# Patient Record
Sex: FEMALE | NOT HISPANIC OR LATINO | URBAN - METROPOLITAN AREA
[De-identification: names, ages, dates, MRNs, and addresses within clinical notes are randomized per-mention and may not be internally consistent; named-entity substitution may affect disease eponyms.]

---

## 2017-08-30 ENCOUNTER — IMPORTED ENCOUNTER (OUTPATIENT)
Dept: URBAN - METROPOLITAN AREA CLINIC 31 | Facility: CLINIC | Age: 57
End: 2017-08-30

## 2017-08-30 PROCEDURE — 92015 DETERMINE REFRACTIVE STATE: CPT

## 2017-08-30 PROCEDURE — 92310 CONTACT LENS FITTING OU: CPT

## 2017-08-30 PROCEDURE — 92014 COMPRE OPH EXAM EST PT 1/>: CPT

## 2017-08-30 NOTE — PATIENT DISCUSSION
1.  [place]-  getting new glasses will wear more. 2. Wearing Clariti MONO but needs more reading. Int in more moist cl. Pt has 1-2 mths of cls left. Gave samples of Oasys 1 day -4.00-0.75x180/-2.00-1.25x090 MONO for more moist cl and more reading on left. May need to change right cl to -3.75 for eyes to balance. Right Dominant. Using [employer] for glasses Eval 80.

## 2018-09-10 ENCOUNTER — IMPORTED ENCOUNTER (OUTPATIENT)
Dept: URBAN - METROPOLITAN AREA CLINIC 31 | Facility: CLINIC | Age: 58
End: 2018-09-10

## 2018-09-10 PROCEDURE — 92310 CONTACT LENS FITTING OU: CPT

## 2018-09-10 PROCEDURE — 92015 DETERMINE REFRACTIVE STATE: CPT

## 2018-09-10 PROCEDURE — 92014 COMPRE OPH EXAM EST PT 1/>: CPT

## 2018-09-10 NOTE — PATIENT DISCUSSION
1.  [place]-  getting new glasses will wear more. increase dist. 2. Wearing Oasys 1 day MONO. Disc cutting back OS to give more dist.  Pt is fine.  -4.00-0.75x180/-2.00-1.25x090 MONO for more moist cl. Right Dominant. Using [employer]emed for glasses Eval 80.3.   RTN 1 yr CE  [employer]

## 2019-09-27 ENCOUNTER — IMPORTED ENCOUNTER (OUTPATIENT)
Dept: URBAN - METROPOLITAN AREA CLINIC 31 | Facility: CLINIC | Age: 59
End: 2019-09-27

## 2019-10-03 ENCOUNTER — IMPORTED ENCOUNTER (OUTPATIENT)
Dept: URBAN - METROPOLITAN AREA CLINIC 31 | Facility: CLINIC | Age: 59
End: 2019-10-03

## 2019-10-03 PROCEDURE — 92014 COMPRE OPH EXAM EST PT 1/>: CPT

## 2019-10-03 PROCEDURE — 92015 DETERMINE REFRACTIVE STATE: CPT

## 2019-10-03 PROCEDURE — 92310 CONTACT LENS FITTING OU: CPT

## 2020-10-05 ENCOUNTER — IMPORTED ENCOUNTER (OUTPATIENT)
Dept: URBAN - METROPOLITAN AREA CLINIC 31 | Facility: CLINIC | Age: 60
End: 2020-10-05

## 2020-10-05 PROBLEM — H02.834: Noted: 2020-10-05

## 2020-10-05 PROBLEM — H02.831: Noted: 2020-10-05

## 2020-10-05 PROCEDURE — 92310 CONTACT LENS FITTING OU: CPT

## 2020-10-05 PROCEDURE — 92014 COMPRE OPH EXAM EST PT 1/>: CPT

## 2020-10-05 PROCEDURE — V2521 CNTCT LENS HYDROPHILIC TORIC: HCPCS

## 2020-10-05 NOTE — PATIENT DISCUSSION
1.  Dermatochalasis OU:  Patient currently symptomatic. --Wants sx-- will call micheal. 2. [place]-  getting new glasses will wear more. increase dist. 2. Wearing Oasys 1 day MONO. Disc cutting back OS to give more dist.  Pt is fine.  -4.00-0.75x180/-2.00-1.25x090 MONO for more moist cl. Right Dominant. Using Eyemed for glasses Eval 80.3.   RTN 1 yr CE  [facility] to [employer] summer 2021

## 2020-10-05 NOTE — PATIENT DISCUSSION
1.  [place]-  getting new glasses will wear more. increase dist. 2. Wearing Oasys 1 day MONO. Disc cutting back OS to give more dist.  Pt is fine.  -4.00-0.75x180/-2.00-1.25x090 MONO for more moist cl. Right Dominant. Using Eyemed for glasses Eval 80.3.   RTN 1 yr CE  [facility] [place]

## 2020-11-17 ENCOUNTER — OFFICE VISIT (OUTPATIENT)
Dept: URBAN - METROPOLITAN AREA CLINIC 68 | Facility: CLINIC | Age: 60
End: 2020-11-17

## 2020-11-25 ENCOUNTER — OFFICE VISIT (OUTPATIENT)
Dept: URBAN - METROPOLITAN AREA CLINIC 68 | Facility: CLINIC | Age: 60
End: 2020-11-25

## 2020-12-01 ENCOUNTER — OFFICE VISIT (OUTPATIENT)
Dept: URBAN - METROPOLITAN AREA CLINIC 68 | Facility: CLINIC | Age: 60
End: 2020-12-01

## 2020-12-02 ENCOUNTER — OFFICE VISIT (OUTPATIENT)
Dept: URBAN - METROPOLITAN AREA CLINIC 68 | Facility: CLINIC | Age: 60
End: 2020-12-02

## 2020-12-09 ENCOUNTER — OFFICE VISIT (OUTPATIENT)
Dept: URBAN - METROPOLITAN AREA SURGERY CENTER 12 | Facility: SURGERY CENTER | Age: 60
End: 2020-12-09

## 2020-12-10 ENCOUNTER — LAB OUTSIDE AN ENCOUNTER (OUTPATIENT)
Dept: URBAN - METROPOLITAN AREA CLINIC 68 | Facility: CLINIC | Age: 60
End: 2020-12-10

## 2020-12-10 LAB — 01: (no result)

## 2020-12-16 ENCOUNTER — TELEPHONE ENCOUNTER (OUTPATIENT)
Dept: URBAN - METROPOLITAN AREA CLINIC 68 | Facility: CLINIC | Age: 60
End: 2020-12-16

## 2020-12-17 ENCOUNTER — TELEPHONE ENCOUNTER (OUTPATIENT)
Dept: URBAN - METROPOLITAN AREA CLINIC 68 | Facility: CLINIC | Age: 60
End: 2020-12-17

## 2020-12-18 ENCOUNTER — TELEPHONE ENCOUNTER (OUTPATIENT)
Dept: URBAN - METROPOLITAN AREA CLINIC 68 | Facility: CLINIC | Age: 60
End: 2020-12-18

## 2020-12-22 ENCOUNTER — OFFICE VISIT (OUTPATIENT)
Dept: URBAN - METROPOLITAN AREA CLINIC 68 | Facility: CLINIC | Age: 60
End: 2020-12-22

## 2021-03-30 ENCOUNTER — OFFICE VISIT (OUTPATIENT)
Dept: URBAN - METROPOLITAN AREA CLINIC 68 | Facility: CLINIC | Age: 61
End: 2021-03-30

## 2021-04-13 ENCOUNTER — OFFICE VISIT (OUTPATIENT)
Dept: URBAN - METROPOLITAN AREA CLINIC 68 | Facility: CLINIC | Age: 61
End: 2021-04-13

## 2021-05-13 ENCOUNTER — OFFICE VISIT (OUTPATIENT)
Dept: URBAN - METROPOLITAN AREA CLINIC 68 | Facility: CLINIC | Age: 61
End: 2021-05-13

## 2021-05-18 ENCOUNTER — OFFICE VISIT (OUTPATIENT)
Dept: URBAN - METROPOLITAN AREA CLINIC 68 | Facility: CLINIC | Age: 61
End: 2021-05-18

## 2021-06-30 ENCOUNTER — OFFICE VISIT (OUTPATIENT)
Dept: URBAN - METROPOLITAN AREA CLINIC 68 | Facility: CLINIC | Age: 61
End: 2021-06-30

## 2021-07-22 ENCOUNTER — TELEPHONE ENCOUNTER (OUTPATIENT)
Dept: URBAN - METROPOLITAN AREA CLINIC 68 | Facility: CLINIC | Age: 61
End: 2021-07-22

## 2021-07-22 ENCOUNTER — OFFICE VISIT (OUTPATIENT)
Dept: URBAN - METROPOLITAN AREA CLINIC 68 | Facility: CLINIC | Age: 61
End: 2021-07-22

## 2021-08-17 ENCOUNTER — OFFICE VISIT (OUTPATIENT)
Dept: URBAN - METROPOLITAN AREA CLINIC 68 | Facility: CLINIC | Age: 61
End: 2021-08-17

## 2021-10-19 ENCOUNTER — IMPORTED ENCOUNTER (OUTPATIENT)
Dept: URBAN - METROPOLITAN AREA CLINIC 31 | Facility: CLINIC | Age: 61
End: 2021-10-19

## 2021-10-19 PROBLEM — H02.831: Noted: 2021-10-19

## 2021-10-19 PROBLEM — H02.834: Noted: 2021-10-19

## 2021-10-19 PROCEDURE — 92310 CONTACT LENS FITTING OU: CPT

## 2021-10-19 PROCEDURE — 92014 COMPRE OPH EXAM EST PT 1/>: CPT

## 2021-10-19 NOTE — PATIENT DISCUSSION
1.  Dermatochalasis OU:  Patient currently symptomatic. --Wants sx-- will call micheal. 2. [place]-  getting new glasses will wear more. increase dist. 2. Wearing Oasys 1 day MONO.  --Order more moist cl and more reading--- My day -4.00-0.75x180/-1.75-1.25x090 MONO for more  near. Right Dominant. Eval 130.3.   RTN 1 yr CE  [facility] to [employer] summer 2021

## 2021-12-22 ENCOUNTER — OFFICE VISIT (OUTPATIENT)
Dept: URBAN - METROPOLITAN AREA CLINIC 68 | Facility: CLINIC | Age: 61
End: 2021-12-22

## 2022-04-01 ASSESSMENT — TONOMETRY
OS_IOP_MMHG: 17
OS_IOP_MMHG: 17
OD_IOP_MMHG: 18
OS_IOP_MMHG: 18
OD_IOP_MMHG: 16
OD_IOP_MMHG: 17
OS_IOP_MMHG: 18
OD_IOP_MMHG: 18
OS_IOP_MMHG: 20
OD_IOP_MMHG: 17

## 2022-04-01 ASSESSMENT — VISUAL ACUITY
OS_SC: 20/20
OD_SC: 20/25

## 2022-06-04 ENCOUNTER — TELEPHONE ENCOUNTER (OUTPATIENT)
Dept: URBAN - METROPOLITAN AREA CLINIC 68 | Facility: CLINIC | Age: 62
End: 2022-06-04

## 2022-06-04 RX ORDER — CEPHALEXIN 500 MG/1
CEPHALEXIN( 500MG ORAL   ) INACTIVE -HX ENTRY CAPSULE ORAL
OUTPATIENT
Start: 2019-07-24 | End: 2016-08-03

## 2022-06-04 RX ORDER — ESCITALOPRAM 20 MG/1
ESCITALOPRAM OXALATE( 20MG ORAL   ) INACTIVE -HX ENTRY TABLET, FILM COATED ORAL
OUTPATIENT
Start: 2019-11-14

## 2022-06-04 RX ORDER — MIDODRINE HYDROCHLORIDE 2.5 MG/1
MIDODRINE HCL( 2.5MG ORAL   ) INACTIVE -HX ENTRY TABLET ORAL
OUTPATIENT
Start: 2019-11-14

## 2022-06-04 RX ORDER — CLOBETASOL PROPIONATE 0.5 MG/G
CLOBETASOL PROPIONATE( 0.05% EXTERNAL   ) INACTIVE -HX ENTRY OINTMENT TOPICAL
OUTPATIENT
Start: 2019-11-14

## 2022-06-04 RX ORDER — AZITHROMYCIN 250 MG/1
AZITHROMYCIN( 250MG ORAL   ) INACTIVE -HX ENTRY TABLET, FILM COATED ORAL
OUTPATIENT
Start: 2019-11-14

## 2022-06-04 RX ORDER — HYOSCYAMINE SULFATE 0.12 MG/1
TABLET, ORALLY DISINTEGRATING ORAL
Qty: 60 | Refills: 60 | OUTPATIENT
Start: 2019-11-14 | End: 2020-11-17

## 2022-06-04 RX ORDER — HYDROCODONE BITARTRATE AND IBUPROFEN 7.5; 2 MG/1; MG/1
HYDROCODONE-IBUPROFEN( 7.5-200MG ORAL   ) INACTIVE -HX ENTRY TABLET ORAL
OUTPATIENT
Start: 2019-07-24 | End: 2016-08-03

## 2022-06-04 RX ORDER — PROGESTERONE 100 MG/1
PROGESTERONE MICRONIZED( 100MG ORAL   ) INACTIVE -HX ENTRY CAPSULE ORAL
OUTPATIENT
Start: 2019-07-24 | End: 2016-08-03

## 2022-06-04 RX ORDER — CYCLOBENZAPRINE HYDROCHLORIDE 10 MG/10MG
CYCLOBENZAPRINE HCL( 10MG ORAL   ) INACTIVE -HX ENTRY TABLET ORAL
OUTPATIENT
Start: 2019-07-24 | End: 2019-06-28

## 2022-06-04 RX ORDER — CHOLECALCIFEROL (VITAMIN D3) 50 MCG
VITAMIN D( 2000UNIT ORAL   ) INACTIVE -HX ENTRY TABLET ORAL
OUTPATIENT
Start: 2019-07-24 | End: 2016-08-03

## 2022-06-04 RX ORDER — CLINDAMYCIN HYDROCHLORIDE 150 MG/1
CLINDAMYCIN HCL( 150MG ORAL   ) INACTIVE -HX ENTRY CAPSULE ORAL
OUTPATIENT
Start: 2019-07-24 | End: 2016-08-03

## 2022-06-04 RX ORDER — ESCITALOPRAM 10 MG/1
ESCITALOPRAM OXALATE( 10MG ORAL   ) INACTIVE -HX ENTRY TABLET, FILM COATED ORAL
OUTPATIENT
Start: 2019-11-14

## 2022-06-04 RX ORDER — ESTRADIOL 0.05 MG/D
MINIVELLE( 0.05MG/24HR TRANSDERMAL   ) INACTIVE -HX ENTRY FILM, EXTENDED RELEASE TRANSDERMAL
OUTPATIENT
Start: 2019-07-24 | End: 2018-06-27

## 2022-06-04 RX ORDER — PROGESTERONE 200 MG/1
PROGESTERONE MICRONIZED( 200MG ORAL   ) INACTIVE -HX ENTRY CAPSULE, LIQUID FILLED ORAL
OUTPATIENT
Start: 2019-07-24 | End: 2018-06-27

## 2022-06-04 RX ORDER — OXYCODONE AND ACETAMINOPHEN 5; 325 MG/1; MG/1
OXYCODONE-ACETAMINOPHEN( 5-325MG ORAL   ) INACTIVE -HX ENTRY TABLET ORAL
OUTPATIENT
Start: 2019-07-24 | End: 2016-08-03

## 2022-06-04 RX ORDER — PRAVASTATIN SODIUM 10 MG/1
PRAVASTATIN SODIUM( 10MG ORAL   ) INACTIVE -HX ENTRY TABLET ORAL
OUTPATIENT
Start: 2019-07-24 | End: 2018-06-27

## 2022-06-04 RX ORDER — VALSARTAN AND HYDROCHLOROTHIAZIDE 160; 12.5 MG/1; MG/1
VALSARTAN-HYDROCHLOROTHIAZIDE( 160-12.5MG ORAL   ) INACTIVE -HX ENTRY TABLET, FILM COATED ORAL
OUTPATIENT
Start: 2018-07-31 | End: 2018-07-31

## 2022-06-04 RX ORDER — POLYETHYLENE GLYCOL 3350, SODIUM SULFATE, SODIUM CHLORIDE, POTASSIUM CHLORIDE, ASCORBIC ACID, SODIUM ASCORBATE 140-9-5.2G
KIT ORAL AS DIRECTED
Qty: 1 | Refills: 0 | OUTPATIENT
Start: 2020-11-17 | End: 2020-11-18

## 2022-06-04 RX ORDER — FLUTICASONE PROPIONATE 50 UG/1
FLUTICASONE PROPIONATE( 50MCG/ACT NASAL   ) INACTIVE -HX ENTRY SPRAY, METERED NASAL
OUTPATIENT
Start: 2019-11-14

## 2022-06-05 ENCOUNTER — TELEPHONE ENCOUNTER (OUTPATIENT)
Dept: URBAN - METROPOLITAN AREA CLINIC 68 | Facility: CLINIC | Age: 62
End: 2022-06-05

## 2022-06-05 RX ORDER — DULOXETINE 60 MG/1
DULOXETINE HCL( 60MG ORAL   ) ACTIVE -HX ENTRY CAPSULE, DELAYED RELEASE ORAL
Status: ACTIVE | COMMUNITY
Start: 2021-07-22

## 2022-06-05 RX ORDER — LOSARTAN POTASSIUM AND HYDROCHLOROTHIAZIDE 50; 12.5 MG/1; MG/1
LOSARTAN POTASSIUM-HCTZ( 50-12.5MG ORAL   ) ACTIVE -HX ENTRY TABLET, FILM COATED ORAL
Status: ACTIVE | COMMUNITY
Start: 2021-07-22

## 2022-06-05 RX ORDER — PRAVASTATIN SODIUM 20 MG/1
PRAVASTATIN SODIUM( 20MG ORAL   ) ACTIVE -HX ENTRY TABLET ORAL
Status: ACTIVE | COMMUNITY
Start: 2021-07-22

## 2022-06-25 ENCOUNTER — TELEPHONE ENCOUNTER (OUTPATIENT)
Age: 62
End: 2022-06-25

## 2022-06-25 RX ORDER — ESCITALOPRAM 10 MG/1
ESCITALOPRAM OXALATE( 10MG ORAL   ) INACTIVE -HX ENTRY TABLET, FILM COATED ORAL
OUTPATIENT
Start: 2019-11-14

## 2022-06-25 RX ORDER — AZITHROMYCIN 250 MG/1
AZITHROMYCIN( 250MG ORAL   ) INACTIVE -HX ENTRY TABLET, FILM COATED ORAL
OUTPATIENT
Start: 2019-11-14

## 2022-06-25 RX ORDER — MIDODRINE HYDROCHLORIDE 2.5 MG/1
MIDODRINE HCL( 2.5MG ORAL   ) INACTIVE -HX ENTRY TABLET ORAL
OUTPATIENT
Start: 2019-11-14

## 2022-06-25 RX ORDER — CLOBETASOL PROPIONATE 0.5 MG/G
CLOBETASOL PROPIONATE( 0.05% EXTERNAL   ) INACTIVE -HX ENTRY OINTMENT TOPICAL
OUTPATIENT
Start: 2019-11-14

## 2022-06-25 RX ORDER — CYCLOBENZAPRINE HYDROCHLORIDE 10 MG/1
CYCLOBENZAPRINE HCL( 10MG ORAL   ) INACTIVE -HX ENTRY TABLET, FILM COATED ORAL
OUTPATIENT
Start: 2019-07-24 | End: 2019-06-28

## 2022-06-25 RX ORDER — FLUTICASONE PROPIONATE 50 UG/1
FLUTICASONE PROPIONATE( 50MCG/ACT NASAL   ) INACTIVE -HX ENTRY SPRAY, METERED NASAL
OUTPATIENT
Start: 2019-11-14

## 2022-06-25 RX ORDER — HYDROCODONE BITARTRATE AND IBUPROFEN 7.5; 2 MG/1; MG/1
HYDROCODONE-IBUPROFEN( 7.5-200MG ORAL   ) INACTIVE -HX ENTRY TABLET ORAL
OUTPATIENT
Start: 2019-07-24 | End: 2016-08-03

## 2022-06-25 RX ORDER — CHOLECALCIFEROL (VITAMIN D3) 50 MCG
VITAMIN D( 2000UNIT ORAL   ) INACTIVE -HX ENTRY TABLET ORAL
OUTPATIENT
Start: 2019-07-24 | End: 2016-08-03

## 2022-06-25 RX ORDER — CEPHALEXIN 500 MG/1
CEPHALEXIN( 500MG ORAL   ) INACTIVE -HX ENTRY CAPSULE ORAL
OUTPATIENT
Start: 2019-07-24 | End: 2016-08-03

## 2022-06-25 RX ORDER — VALSARTAN AND HYDROCHLOROTHIAZIDE 160; 12.5 MG/1; MG/1
VALSARTAN-HYDROCHLOROTHIAZIDE( 160-12.5MG ORAL   ) INACTIVE -HX ENTRY TABLET, FILM COATED ORAL
OUTPATIENT
Start: 2018-07-31 | End: 2018-07-31

## 2022-06-25 RX ORDER — ESTRADIOL 0.05 MG/D
MINIVELLE( 0.05MG/24HR TRANSDERMAL   ) INACTIVE -HX ENTRY FILM, EXTENDED RELEASE TRANSDERMAL
OUTPATIENT
Start: 2019-07-24 | End: 2018-06-27

## 2022-06-25 RX ORDER — OXYCODONE AND ACETAMINOPHEN 325; 5 MG/1; MG/1
OXYCODONE-ACETAMINOPHEN( 5-325MG ORAL   ) INACTIVE -HX ENTRY TABLET ORAL
OUTPATIENT
Start: 2019-07-24 | End: 2016-08-03

## 2022-06-25 RX ORDER — CLINDAMYCIN HYDROCHLORIDE 150 MG/1
CLINDAMYCIN HCL( 150MG ORAL   ) INACTIVE -HX ENTRY CAPSULE ORAL
OUTPATIENT
Start: 2019-07-24 | End: 2016-08-03

## 2022-06-25 RX ORDER — POLYETHYLENE GLYCOL 3350, SODIUM SULFATE, SODIUM CHLORIDE, POTASSIUM CHLORIDE, ASCORBIC ACID, SODIUM ASCORBATE 140-9-5.2G
KIT ORAL AS DIRECTED
Qty: 1 | Refills: 0 | OUTPATIENT
Start: 2020-11-17 | End: 2020-11-18

## 2022-06-25 RX ORDER — ESCITALOPRAM 20 MG/1
ESCITALOPRAM OXALATE( 20MG ORAL   ) INACTIVE -HX ENTRY TABLET, FILM COATED ORAL
OUTPATIENT
Start: 2019-11-14

## 2022-06-25 RX ORDER — PRAVASTATIN SODIUM 10 MG/1
PRAVASTATIN SODIUM( 10MG ORAL   ) INACTIVE -HX ENTRY TABLET ORAL
OUTPATIENT
Start: 2019-07-24 | End: 2018-06-27

## 2022-06-26 ENCOUNTER — TELEPHONE ENCOUNTER (OUTPATIENT)
Age: 62
End: 2022-06-26

## 2022-06-26 RX ORDER — DULOXETINE HYDROCHLORIDE 60 MG/1
DULOXETINE HCL( 60MG ORAL   ) ACTIVE -HX ENTRY CAPSULE, DELAYED RELEASE PELLETS ORAL
Status: ACTIVE | COMMUNITY
Start: 2021-07-22

## 2022-06-26 RX ORDER — PRAVASTATIN SODIUM 20 MG/1
PRAVASTATIN SODIUM( 20MG ORAL   ) ACTIVE -HX ENTRY TABLET ORAL
Status: ACTIVE | COMMUNITY
Start: 2021-07-22

## 2022-06-26 RX ORDER — LOSARTAN POTASSIUM AND HYDROCHLOROTHIAZIDE 50; 12.5 MG/1; MG/1
LOSARTAN POTASSIUM-HCTZ( 50-12.5MG ORAL   ) ACTIVE -HX ENTRY TABLET, FILM COATED ORAL
Status: ACTIVE | COMMUNITY
Start: 2021-07-22